# Patient Record
Sex: MALE | Race: WHITE | NOT HISPANIC OR LATINO | URBAN - METROPOLITAN AREA
[De-identification: names, ages, dates, MRNs, and addresses within clinical notes are randomized per-mention and may not be internally consistent; named-entity substitution may affect disease eponyms.]

---
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
## 2018-04-10 ENCOUNTER — EMERGENCY (EMERGENCY)
Facility: HOSPITAL | Age: 18
LOS: 1 days | End: 2018-04-10
Attending: EMERGENCY MEDICINE
Payer: COMMERCIAL

## 2018-04-10 VITALS
DIASTOLIC BLOOD PRESSURE: 82 MMHG | HEART RATE: 93 BPM | WEIGHT: 184.97 LBS | SYSTOLIC BLOOD PRESSURE: 159 MMHG | HEIGHT: 72 IN | OXYGEN SATURATION: 100 % | RESPIRATION RATE: 18 BRPM | TEMPERATURE: 98 F

## 2018-04-10 LAB
ALBUMIN SERPL ELPH-MCNC: 4.9 G/DL — SIGNIFICANT CHANGE UP (ref 3.3–5)
ALP SERPL-CCNC: 121 U/L — HIGH (ref 40–120)
ALT FLD-CCNC: 72 U/L RC — HIGH (ref 10–45)
ANION GAP SERPL CALC-SCNC: 15 MMOL/L — SIGNIFICANT CHANGE UP (ref 5–17)
APTT BLD: 33.1 SEC — SIGNIFICANT CHANGE UP (ref 27.5–37.4)
AST SERPL-CCNC: 31 U/L — SIGNIFICANT CHANGE UP (ref 10–40)
BASOPHILS # BLD AUTO: 0 K/UL — SIGNIFICANT CHANGE UP (ref 0–0.2)
BASOPHILS NFR BLD AUTO: 0.5 % — SIGNIFICANT CHANGE UP (ref 0–2)
BILIRUB SERPL-MCNC: 0.5 MG/DL — SIGNIFICANT CHANGE UP (ref 0.2–1.2)
BUN SERPL-MCNC: 10 MG/DL — SIGNIFICANT CHANGE UP (ref 7–23)
CALCIUM SERPL-MCNC: 10.1 MG/DL — SIGNIFICANT CHANGE UP (ref 8.4–10.5)
CHLORIDE SERPL-SCNC: 101 MMOL/L — SIGNIFICANT CHANGE UP (ref 96–108)
CO2 SERPL-SCNC: 24 MMOL/L — SIGNIFICANT CHANGE UP (ref 22–31)
CREAT SERPL-MCNC: 0.92 MG/DL — SIGNIFICANT CHANGE UP (ref 0.5–1.3)
D DIMER BLD IA.RAPID-MCNC: <150 NG/ML DDU — SIGNIFICANT CHANGE UP
EOSINOPHIL # BLD AUTO: 0.3 K/UL — SIGNIFICANT CHANGE UP (ref 0–0.5)
EOSINOPHIL NFR BLD AUTO: 4.2 % — SIGNIFICANT CHANGE UP (ref 0–6)
GLUCOSE SERPL-MCNC: 102 MG/DL — HIGH (ref 70–99)
HCT VFR BLD CALC: 46.8 % — SIGNIFICANT CHANGE UP (ref 39–50)
HGB BLD-MCNC: 16.1 G/DL — SIGNIFICANT CHANGE UP (ref 13–17)
INR BLD: 1.04 RATIO — SIGNIFICANT CHANGE UP (ref 0.88–1.16)
LYMPHOCYTES # BLD AUTO: 1.5 K/UL — SIGNIFICANT CHANGE UP (ref 1–3.3)
LYMPHOCYTES # BLD AUTO: 21.8 % — SIGNIFICANT CHANGE UP (ref 13–44)
MCHC RBC-ENTMCNC: 29.2 PG — SIGNIFICANT CHANGE UP (ref 27–34)
MCHC RBC-ENTMCNC: 34.4 GM/DL — SIGNIFICANT CHANGE UP (ref 32–36)
MCV RBC AUTO: 85 FL — SIGNIFICANT CHANGE UP (ref 80–100)
MONOCYTES # BLD AUTO: 0.5 K/UL — SIGNIFICANT CHANGE UP (ref 0–0.9)
MONOCYTES NFR BLD AUTO: 7.7 % — SIGNIFICANT CHANGE UP (ref 2–14)
NEUTROPHILS # BLD AUTO: 4.6 K/UL — SIGNIFICANT CHANGE UP (ref 1.8–7.4)
NEUTROPHILS NFR BLD AUTO: 65.8 % — SIGNIFICANT CHANGE UP (ref 43–77)
PLATELET # BLD AUTO: 230 K/UL — SIGNIFICANT CHANGE UP (ref 150–400)
POTASSIUM SERPL-MCNC: 4 MMOL/L — SIGNIFICANT CHANGE UP (ref 3.5–5.3)
POTASSIUM SERPL-SCNC: 4 MMOL/L — SIGNIFICANT CHANGE UP (ref 3.5–5.3)
PROT SERPL-MCNC: 8.5 G/DL — HIGH (ref 6–8.3)
PROTHROM AB SERPL-ACNC: 11.3 SEC — SIGNIFICANT CHANGE UP (ref 9.8–12.7)
RBC # BLD: 5.51 M/UL — SIGNIFICANT CHANGE UP (ref 4.2–5.8)
RBC # FLD: 11.9 % — SIGNIFICANT CHANGE UP (ref 10.3–14.5)
SODIUM SERPL-SCNC: 140 MMOL/L — SIGNIFICANT CHANGE UP (ref 135–145)
TROPONIN T SERPL-MCNC: <0.01 NG/ML — SIGNIFICANT CHANGE UP (ref 0–0.06)
WBC # BLD: 7 K/UL — SIGNIFICANT CHANGE UP (ref 3.8–10.5)
WBC # FLD AUTO: 7 K/UL — SIGNIFICANT CHANGE UP (ref 3.8–10.5)

## 2018-04-10 PROCEDURE — 71046 X-RAY EXAM CHEST 2 VIEWS: CPT | Mod: 26

## 2018-04-10 PROCEDURE — 99218: CPT

## 2018-04-10 RX ORDER — SODIUM CHLORIDE 9 MG/ML
3 INJECTION INTRAMUSCULAR; INTRAVENOUS; SUBCUTANEOUS EVERY 8 HOURS
Qty: 0 | Refills: 0 | Status: DISCONTINUED | OUTPATIENT
Start: 2018-04-10 | End: 2018-04-14

## 2018-04-10 RX ORDER — IBUPROFEN 200 MG
600 TABLET ORAL EVERY 6 HOURS
Qty: 0 | Refills: 0 | Status: DISCONTINUED | OUTPATIENT
Start: 2018-04-10 | End: 2018-04-14

## 2018-04-10 RX ADMIN — SODIUM CHLORIDE 3 MILLILITER(S): 9 INJECTION INTRAMUSCULAR; INTRAVENOUS; SUBCUTANEOUS at 22:11

## 2018-04-10 NOTE — ED PROVIDER NOTE - OBJECTIVE STATEMENT
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 No pmh, no dm,htn,hld,habits.travel.cancer,truama. Pt comes to ed complains of pain rt paraspinal onset this am with rad to rt shoulder post. Worse with deep resp. no fever chills, vomitng, mild cough wihtout sputum or hemoptysis. No hx of similar illness. No abd pain.  Didn't take any analgesia

## 2018-04-10 NOTE — ED CDU PROVIDER INITIAL DAY NOTE - DETAILS
18 y/o male with no PMHx presented to the ED central chest pressure radiating to R shoulder x1 day.   Chest pain  -continuous monitoring and frequent re-evals  -cardiac monitor  -TTE in AM  -Ibuprofen 600mg q6 as needed for pain  -Discussed case with ED attending Dr. Sreekanth Rey 16 y/o male with no PMHx presented to the ED central chest pressure radiating to R shoulder x1 day.   Chest pain  -continuous monitoring and frequent re-evals  -cardiac monitor  -repeat cardiac enzymes at 22:30  -TTE in AM  -Ibuprofen 600mg q6 as needed for pain  -Discussed case with ED attending Dr. Sreekanth Rey

## 2018-04-10 NOTE — ED CDU PROVIDER INITIAL DAY NOTE - ATTENDING CONTRIBUTION TO CARE
I have personally performed a face to face diagnostic evaluation on this patient.  I have reviewed the ACP note and agree with the history, exam, and plan of care, except as noted.  History and Exam by me shows  See Ed provider note  Sreekanth Rey MD, Facep

## 2018-04-10 NOTE — ED PROVIDER NOTE - PROGRESS NOTE DETAILS
Luis Felipe Casarez (Resident): 18 y/o CP, pleuritic, substernal radiating to R shoulder. Unable to exacerbate pain with palpation of movement of back muscles. No diaphoresis, N/V/D, abd pain. No family hx of sudden cardiac death, family member did have MI at 50. With family out of room, denies cocaine or drugs. no herbal supplements. never had pain like this before. No recent viral illess. No recent prolonged immobilization. No leg pain/swelling. No family hx of DVT or PE. Does not take any pre-workout supplements.

## 2018-04-10 NOTE — ED CDU PROVIDER DISPOSITION NOTE - CLINICAL COURSE
18 y/o male with no PMHx presented to the ED for central chest pressure radiating to R shoulder x1 day. Patient described the 6/10 pain to be pressure, sharp, and heaviness which began this morning. Chest pressure radiates to right shoulder and posterior right should near scapula. CP exacerbated with deep inspiration and has had a non productive cough starting today. Also admits to some jaw pain and locking of the jaw which started with the onset of symptoms. Patient stated he has loose stools daily but today seemed looser than usual. Patient also admitted to "head pressure" but denied headache and just feels like his head is congested. Patient does admit to being sexually active with occasional condom use and is not monogamous. Denied recent illness, throat pain, ear pain, dysuria, urinary frequency, discharge, N/V/D, SOB, dizziness, HA, weakness, lower back pain, neck stiffness, abdominal pain, rash    ED course: CBC w/ diff negative, ALT elevated 76 and ALk Phos 121 on CMP, CXR negative, Trop negx1, D-Dimer negative. EKG showed no ischemic changes or ST elevations. Patient was given ibuprofen 600mg 6hours prn for pain. TTE showed____ 16 y/o male with no PMHx presented to the ED for central chest pressure radiating to R shoulder x1 day. Patient described the 6/10 pain to be pressure, sharp, and heaviness which began this morning. Chest pressure radiates to right shoulder and posterior right should near scapula. CP exacerbated with deep inspiration and has had a non productive cough starting today. Also admits to some jaw pain and locking of the jaw which started with the onset of symptoms. Patient stated he has loose stools daily but today seemed looser than usual. Patient also admitted to "head pressure" but denied headache and just feels like his head is congested. Patient does admit to being sexually active with occasional condom use and is not monogamous. Denied recent illness, throat pain, ear pain, dysuria, urinary frequency, discharge, N/V/D, SOB, dizziness, HA, weakness, lower back pain, neck stiffness, abdominal pain, rash    ED course: CBC w/ diff negative, ALT elevated 76 and ALk Phos 121 on CMP, CXR negative, Trop negx1, D-Dimer negative. EKG showed no ischemic changes or ST elevations. Patient was given ibuprofen 600mg 6hours prn for pain. TTE was within normal limits. Patient felt aware toward discharge and symptoms improved. Patient safe to be discharged home per Dr. Rachel

## 2018-04-10 NOTE — ED CDU PROVIDER INITIAL DAY NOTE - OBJECTIVE STATEMENT
18 y/o male with no PMHx presented to the ED for central chest pressure radiating to R shoulder x1 day. Patient described the 6/10 pain to be pressure, sharp, and heaviness which began this morning. Chest pressure radiates to right shoulder and posterior right should near scapula. CP exacerbated with deep inspiration and has had a non productive cough starting today. Also admits to some jaw pain and locking of the jaw which started with the onset of symptoms. Patient stated he has loose stools daily but today seemed looser than usual. Patient also admitted to "head pressure" but denied headache and just feels like his head is congested. Patient does admit to being sexually active with occasional condom use and is not monogamous. Denied recent illness, throat pain, ear pain, dysuria, urinary frequency, discharge, N/V/D, SOB, dizziness, HA, weakness, lower back pain, neck stiffness, abdominal pain, rash 18 y/o male with no PMHx presented to the ED for central chest pressure radiating to R shoulder x1 day. Patient described the 6/10 pain to be pressure, sharp, and heaviness which began this morning. Chest pressure radiates to right shoulder and posterior right should near scapula. CP exacerbated with deep inspiration and has had a non productive cough starting today. Also admits to some jaw pain and locking of the jaw which started with the onset of symptoms. Patient stated he has loose stools daily but today seemed looser than usual. Patient also admitted to "head pressure" but denied headache and just feels like his head is congested. Patient does admit to being sexually active with occasional condom use and is not monogamous. Denied recent illness, throat pain, ear pain, dysuria, urinary frequency, discharge, N/V/D, SOB, dizziness, HA, weakness, lower back pain, neck stiffness, abdominal pain, rash    ED course: CBC w/ diff negative, ALT elevated 76 and ALk Phos 121 on CMP, CXR negative, Trop negx1, D-Dimer negative 18 y/o male with no PMHx presented to the ED for central chest pressure radiating to R shoulder x1 day. Patient described the 6/10 pain to be pressure, sharp, and heaviness which began this morning. Chest pressure radiates to right shoulder and posterior right should near scapula. CP exacerbated with deep inspiration and has had a non productive cough starting today. Also admits to some jaw pain and locking of the jaw which started with the onset of symptoms. Patient stated he has loose stools daily but today seemed looser than usual. Patient also admitted to "head pressure" but denied headache and just feels like his head is congested. Patient does admit to being sexually active with occasional condom use and is not monogamous. Denied recent illness, throat pain, ear pain, dysuria, urinary frequency, discharge, N/V/D, SOB, dizziness, HA, weakness, lower back pain, neck stiffness, abdominal pain, rash    ED course: CBC w/ diff negative, ALT elevated 76 and ALk Phos 121 on CMP, CXR negative, Trop negx1, D-Dimer negative. EKG showed no ischemic changes or ST elevations 16 y/o male with no PMHx presented to the ED for central chest pressure radiating to R shoulder x1 day. Patient described the 6/10 pain to be pressure, sharp, and heaviness which began this morning. Chest pressure radiates to right shoulder and posterior right should near scapula. CP exacerbated with deep inspiration and has had a non productive cough starting today. Also admits to some jaw pain and locking of the jaw which started with the onset of symptoms. Patient stated he has loose stools daily but today seemed looser than usual. Patient also admitted to "head pressure" but denied headache and just feels like his head is congested. Patient does admit to being sexually active with occasional condom use and is not monogamous. Denied recent illness, throat pain, ear pain, dysuria, urinary frequency, discharge, N/V/D, SOB, dizziness, HA, weakness, lower back pain, neck stiffness, abdominal pain, rash    ED course: CBC w/ diff negative, ALT elevated 76 and ALk Phos 121 on CMP, CXR negative, Trop negx1, D-Dimer negative. EKG showed no ischemic changes or ST elevations    Pediatrician: Dr. Marilou Temple

## 2018-04-10 NOTE — ED CDU PROVIDER DISPOSITION NOTE - PLAN OF CARE
1. Patient to remain well hydrated and avoid skipping meals  2. Patient to follow up with primary medical doctor within 72 hours of discharge  3. Patient to take Motrin or Advil every 4-6 hours as needed for pain  4. Patient can consider follow up with cardiology clinic [phone]  5. If worsening symptoms patient to return to ED immediately

## 2018-04-10 NOTE — ED CDU PROVIDER DISPOSITION NOTE - ATTENDING CONTRIBUTION TO CARE
I have seen and evaluated this patient with the [place] practice clinician.   I agree with the findings  unless other wise stated. I have amended notes where needed.  After my face to face bedside evaluation, I am noting:Pt with atypical chest pain for monitoring and treatment remains chest pain free no cardiac event on tele labs wnl Pt had 2 sets of neg trop T levels no episodes of chest pain will d/c home to be followed with PMD --Rachel

## 2018-04-10 NOTE — ED PROVIDER NOTE - CHPI ED SYMPTOMS NEG
no shortness of breath/no chest pain/no cough/no vomiting/no dizziness/no nausea/no fever/no diaphoresis/no syncope/no chills

## 2018-04-10 NOTE — ED CDU PROVIDER INITIAL DAY NOTE - PROGRESS NOTE DETAILS
CDU NOTE JOSE MURGUIA: Pt resting comfortably, feeling well without complaint. No CP or back pain. NAD, VSS. No events on telemetry. CV: S1S2 RRR, Lungs: CTA b/l. will continue monitoring.

## 2018-04-11 VITALS
TEMPERATURE: 99 F | SYSTOLIC BLOOD PRESSURE: 137 MMHG | HEART RATE: 93 BPM | DIASTOLIC BLOOD PRESSURE: 77 MMHG | RESPIRATION RATE: 18 BRPM | OXYGEN SATURATION: 100 %

## 2018-04-11 PROCEDURE — 71046 X-RAY EXAM CHEST 2 VIEWS: CPT

## 2018-04-11 PROCEDURE — 99217: CPT

## 2018-04-11 PROCEDURE — G0378: CPT

## 2018-04-11 PROCEDURE — 85027 COMPLETE CBC AUTOMATED: CPT

## 2018-04-11 PROCEDURE — 93306 TTE W/DOPPLER COMPLETE: CPT

## 2018-04-11 PROCEDURE — 85379 FIBRIN DEGRADATION QUANT: CPT

## 2018-04-11 PROCEDURE — 99284 EMERGENCY DEPT VISIT MOD MDM: CPT | Mod: 25

## 2018-04-11 PROCEDURE — 93005 ELECTROCARDIOGRAM TRACING: CPT | Mod: 76

## 2018-04-11 PROCEDURE — 85610 PROTHROMBIN TIME: CPT

## 2018-04-11 PROCEDURE — 93306 TTE W/DOPPLER COMPLETE: CPT | Mod: 26

## 2018-04-11 PROCEDURE — 85730 THROMBOPLASTIN TIME PARTIAL: CPT

## 2018-04-11 PROCEDURE — 84484 ASSAY OF TROPONIN QUANT: CPT

## 2018-04-11 PROCEDURE — 80053 COMPREHEN METABOLIC PANEL: CPT

## 2018-04-11 RX ADMIN — SODIUM CHLORIDE 3 MILLILITER(S): 9 INJECTION INTRAMUSCULAR; INTRAVENOUS; SUBCUTANEOUS at 06:11

## 2018-04-11 NOTE — ED ADULT NURSE REASSESSMENT NOTE - COMFORT CARE
plan of care explained/meal provided/po fluids offered
ambulated to bathroom/side rails up/wait time explained

## 2018-04-11 NOTE — ED CDU PROVIDER SUBSEQUENT DAY NOTE - ATTENDING CONTRIBUTION TO CARE
I have seen and evaluated this patient with the advance practice clinician.   I agree with the findings  unless other wise stated. I have amended notes where needed.  After my face to face bedside evaluation, I am noting: Pt with atypical chest pain for monitoring and treatment remains chest pain free no cardiac event on tele labs wnl will continue --Rachel

## 2018-04-11 NOTE — ED CDU PROVIDER SUBSEQUENT DAY NOTE - HISTORY
No interval changes since initial CDU provider note. Pt feels well without complaint. No CP, back pain, or SOB. NAD VSS. no events on tele. Yanet Self No interval changes since initial CDU provider note. Pt feels well without complaint. No CP, back pain, or SOB. NAD VSS. no events on tele. trops negative x 2. will continue monitoring. - JOSE Self

## 2018-04-11 NOTE — ED CDU PROVIDER SUBSEQUENT DAY NOTE - MEDICAL DECISION MAKING DETAILS
Pt with atypical chest pain for monitoring and treatment remains chest pain free no cardiac event on tele labs wnl will continue--Rachel

## 2018-04-11 NOTE — ED ADULT NURSE REASSESSMENT NOTE - ANCILLARY STATUS
cardiovascular tests pending/EKG at bedside/awaiting lab draw/awaiting Echo
cardiovascular tests pending

## 2018-04-11 NOTE — ED ADULT NURSE REASSESSMENT NOTE - NS ED NURSE REASSESS COMMENT FT1
15.00 Pt was reviewed by JOSE Baca  Pt is discharged PT A&OX 4  ML out  Vital signs stable pt ambulates  well . JOSE Baca  explained the follow up care pt verbalized the understanding  on follow up care.
Pt report received from Riana TRUJILLO. Pt transferred to cdu BED 8 for cardiac eval and echo. Pt a/ox3 denies respiratory distress, sob, dyspnea, C/P, palpitations, n/v/d at this time. Pt states symptoms have improved.  VSS, afebrile, IV clean/dry/intact. Pt aware of plan of care, call bell within reach ,safety maintained. Will monitor and assess.
Pt received from CASSANDRA Marcos. Pt oriented to CDU & plan of care was discussed. Pt states he has 4/10 chest discomfort exacerbated by deep inspiration. Pt states he had R shoulder soreness which is now resolved as well as jaw soreness which is still there. Pt denies any SOB, dizziness or palpitations. Safety & comfort measures maintained. Call bell in reach. Will continue to monitor.
07.00 Am received report from CSASANDRA Lion  07.30 Pt  is reassessed. Pt denies  Pain CP N/V/D fever/chills CP/SOB afebrile here Pt is awaiting for ECHO test  IV site looks clean  no infiltration noted  Normal saline flushing well Comfort care & safety measures continued  Continue to monitor

## 2019-06-24 ENCOUNTER — APPOINTMENT (OUTPATIENT)
Dept: PEDIATRICS | Facility: CLINIC | Age: 19
End: 2019-06-24
Payer: COMMERCIAL

## 2019-06-24 VITALS
DIASTOLIC BLOOD PRESSURE: 73 MMHG | BODY MASS INDEX: 28.69 KG/M2 | WEIGHT: 198.13 LBS | HEART RATE: 81 BPM | HEIGHT: 69.75 IN | TEMPERATURE: 98.3 F | SYSTOLIC BLOOD PRESSURE: 123 MMHG

## 2019-06-24 PROCEDURE — 99214 OFFICE O/P EST MOD 30 MIN: CPT

## 2019-06-24 RX ORDER — AZITHROMYCIN 250 MG/1
250 TABLET, FILM COATED ORAL
Qty: 1 | Refills: 0 | Status: ACTIVE | COMMUNITY
Start: 2019-06-24 | End: 1900-01-01

## 2019-06-24 NOTE — HISTORY OF PRESENT ILLNESS
[de-identified] : COUGH FOR 3 WEEKS; SORE THROAT [FreeTextEntry6] : cough x 3 weeks\par worsening\par cant sleep at night\par no fevers\par no h/o asthma\par no h/o seasonal allergies\par

## 2019-06-24 NOTE — PHYSICAL EXAM
[No Acute Distress] : no acute distress [Clear to Ausculatation Bilaterally] : clear to auscultation bilaterally [NL] : normotonic [FreeTextEntry7] : chesty bronchospastic cough

## 2019-06-24 NOTE — DISCUSSION/SUMMARY
[FreeTextEntry1] : Symptomatic therapy\par Inc fluids\par Avoid airway irritants\par Discussed with pt/family that bronchitis in this age group often viral in etiology\par Discussed consideration of antibiotics if suspect Mycoplasma/atypical PN: start zpack\par Call if no better 3 days, sooner for worsening, distress\par recheck prn

## 2019-06-27 ENCOUNTER — APPOINTMENT (OUTPATIENT)
Dept: PEDIATRICS | Facility: CLINIC | Age: 19
End: 2019-06-27

## 2019-07-22 ENCOUNTER — APPOINTMENT (OUTPATIENT)
Dept: PEDIATRICS | Facility: CLINIC | Age: 19
End: 2019-07-22
Payer: COMMERCIAL

## 2019-07-22 VITALS
WEIGHT: 195 LBS | SYSTOLIC BLOOD PRESSURE: 126 MMHG | BODY MASS INDEX: 28.88 KG/M2 | TEMPERATURE: 98.2 F | HEIGHT: 68.75 IN | HEART RATE: 96 BPM | DIASTOLIC BLOOD PRESSURE: 76 MMHG

## 2019-07-22 DIAGNOSIS — Z87.09 PERSONAL HISTORY OF OTHER DISEASES OF THE RESPIRATORY SYSTEM: ICD-10-CM

## 2019-07-22 LAB — S PYO AG SPEC QL IA: NEGATIVE

## 2019-07-22 PROCEDURE — 99213 OFFICE O/P EST LOW 20 MIN: CPT

## 2019-07-22 PROCEDURE — 87880 STREP A ASSAY W/OPTIC: CPT | Mod: QW

## 2019-07-22 RX ORDER — AZITHROMYCIN 250 MG/1
250 TABLET, FILM COATED ORAL
Qty: 1 | Refills: 0 | Status: ACTIVE | COMMUNITY
Start: 2019-07-22 | End: 1900-01-01

## 2019-07-22 NOTE — PHYSICAL EXAM
[Erythematous Oropharynx] : erythematous oropharynx [Palate Petechiae] : palate petechiae [Enlarged Tonsils] : enlarged tonsils  [NL] : warm [de-identified] : mildly tender and swollen anterior cervical lymph  nodes

## 2019-07-22 NOTE — HISTORY OF PRESENT ILLNESS
[de-identified] : sore throat [FreeTextEntry6] : sore throat x 4 days; fever since last night 103\par headache\par

## 2019-07-24 ENCOUNTER — RECORD ABSTRACTING (OUTPATIENT)
Age: 19
End: 2019-07-24

## 2019-07-24 ENCOUNTER — APPOINTMENT (OUTPATIENT)
Dept: PEDIATRICS | Facility: CLINIC | Age: 19
End: 2019-07-24
Payer: COMMERCIAL

## 2019-07-24 VITALS
SYSTOLIC BLOOD PRESSURE: 112 MMHG | DIASTOLIC BLOOD PRESSURE: 70 MMHG | HEART RATE: 118 BPM | WEIGHT: 197.13 LBS | HEIGHT: 69 IN | BODY MASS INDEX: 29.2 KG/M2 | TEMPERATURE: 101.7 F

## 2019-07-24 DIAGNOSIS — Z87.09 PERSONAL HISTORY OF OTHER DISEASES OF THE RESPIRATORY SYSTEM: ICD-10-CM

## 2019-07-24 DIAGNOSIS — Z86.19 PERSONAL HISTORY OF OTHER INFECTIOUS AND PARASITIC DISEASES: ICD-10-CM

## 2019-07-24 LAB — S PYO AG SPEC QL IA: NEGATIVE

## 2019-07-24 PROCEDURE — 87880 STREP A ASSAY W/OPTIC: CPT | Mod: QW

## 2019-07-24 PROCEDURE — 99214 OFFICE O/P EST MOD 30 MIN: CPT

## 2019-07-24 RX ORDER — AMOXICILLIN 875 MG/1
875 TABLET, FILM COATED ORAL
Qty: 20 | Refills: 0 | Status: COMPLETED | COMMUNITY
Start: 2019-05-09

## 2019-07-25 LAB — BACTERIA THROAT CULT: NORMAL

## 2019-07-27 LAB — BACTERIA THROAT CULT: NORMAL

## 2019-08-14 ENCOUNTER — APPOINTMENT (OUTPATIENT)
Dept: PEDIATRICS | Facility: CLINIC | Age: 19
End: 2019-08-14
Payer: COMMERCIAL

## 2019-08-14 VITALS
TEMPERATURE: 97.6 F | WEIGHT: 194.44 LBS | BODY MASS INDEX: 28.47 KG/M2 | SYSTOLIC BLOOD PRESSURE: 129 MMHG | HEART RATE: 73 BPM | DIASTOLIC BLOOD PRESSURE: 76 MMHG | HEIGHT: 69.25 IN

## 2019-08-14 DIAGNOSIS — B27.90 INFECTIOUS MONONUCLEOSIS, UNSPECIFIED W/OUT COMPLICATION: ICD-10-CM

## 2019-08-14 DIAGNOSIS — Z87.09 PERSONAL HISTORY OF OTHER DISEASES OF THE RESPIRATORY SYSTEM: ICD-10-CM

## 2019-08-14 PROCEDURE — 99213 OFFICE O/P EST LOW 20 MIN: CPT

## 2020-12-21 PROBLEM — Z87.09 HISTORY OF PHARYNGITIS: Status: RESOLVED | Noted: 2019-07-24 | Resolved: 2020-12-21

## 2020-12-21 PROBLEM — Z87.09 HISTORY OF SORE THROAT: Status: RESOLVED | Noted: 2019-07-22 | Resolved: 2020-12-21

## 2022-05-27 ENCOUNTER — APPOINTMENT (OUTPATIENT)
Dept: GASTROENTEROLOGY | Facility: CLINIC | Age: 22
End: 2022-05-27

## 2022-09-06 NOTE — DISCUSSION/SUMMARY
Pt would like to speak with nurse in regards to insurance approving for her asthma/fasenra shots
Spoke with patient. She received her [facility] approval letter today in mail.  She will call the [facility] to schedule
[FreeTextEntry1] : sore throat, fever, clinical suspicion for strep given history and physical\par rapid strep neg\par will send out tc\par will empirically tx for strep given high clinical suspicion\par f/up tc results 2 days; discussed if no improvement/worsening sx will send for BW to r/out mono\par pt cell [phone] \par

## 2024-12-09 NOTE — ED ADULT NURSE NOTE - OBJECTIVE STATEMENT
No protocol for requested medication.    Medication: Xarelto   Last office visit date: 12/6/2024  Pharmacy: [id number] - [place] [id number]    Order pended, routed to clinician for review.     pt here for chest pain.  radiates to jaw and right arm.  no n/v